# Patient Record
Sex: MALE | Race: WHITE | NOT HISPANIC OR LATINO | Employment: FULL TIME | URBAN - METROPOLITAN AREA
[De-identification: names, ages, dates, MRNs, and addresses within clinical notes are randomized per-mention and may not be internally consistent; named-entity substitution may affect disease eponyms.]

---

## 2022-09-20 ENCOUNTER — OFFICE VISIT (OUTPATIENT)
Dept: ORTHOPEDIC SURGERY | Facility: CLINIC | Age: 42
End: 2022-09-20

## 2022-09-20 VITALS
HEART RATE: 76 BPM | BODY MASS INDEX: 28.28 KG/M2 | HEIGHT: 71 IN | SYSTOLIC BLOOD PRESSURE: 154 MMHG | WEIGHT: 202 LBS | DIASTOLIC BLOOD PRESSURE: 80 MMHG

## 2022-09-20 DIAGNOSIS — M75.82 ROTATOR CUFF TENDINITIS, LEFT: ICD-10-CM

## 2022-09-20 DIAGNOSIS — M25.512 CHRONIC LEFT SHOULDER PAIN: Primary | ICD-10-CM

## 2022-09-20 DIAGNOSIS — M75.52 SUBACROMIAL BURSITIS OF LEFT SHOULDER JOINT: ICD-10-CM

## 2022-09-20 DIAGNOSIS — G89.29 CHRONIC LEFT SHOULDER PAIN: Primary | ICD-10-CM

## 2022-09-20 PROCEDURE — 73030 X-RAY EXAM OF SHOULDER: CPT | Performed by: ORTHOPAEDIC SURGERY

## 2022-09-20 PROCEDURE — 99203 OFFICE O/P NEW LOW 30 MIN: CPT | Performed by: ORTHOPAEDIC SURGERY

## 2022-09-20 PROCEDURE — 20610 DRAIN/INJ JOINT/BURSA W/O US: CPT | Performed by: ORTHOPAEDIC SURGERY

## 2022-09-20 RX ORDER — MELOXICAM 7.5 MG/1
7.5 TABLET ORAL DAILY
Qty: 30 TABLET | Refills: 5 | Status: SHIPPED | OUTPATIENT
Start: 2022-09-20

## 2022-09-20 RX ORDER — LIDOCAINE HYDROCHLORIDE 10 MG/ML
4 INJECTION, SOLUTION EPIDURAL; INFILTRATION; INTRACAUDAL; PERINEURAL
Status: COMPLETED | OUTPATIENT
Start: 2022-09-20 | End: 2022-09-20

## 2022-09-20 RX ORDER — TRIAMCINOLONE ACETONIDE 40 MG/ML
80 INJECTION, SUSPENSION INTRA-ARTICULAR; INTRAMUSCULAR
Status: COMPLETED | OUTPATIENT
Start: 2022-09-20 | End: 2022-09-20

## 2022-09-20 RX ADMIN — TRIAMCINOLONE ACETONIDE 80 MG: 40 INJECTION, SUSPENSION INTRA-ARTICULAR; INTRAMUSCULAR at 14:35

## 2022-09-20 RX ADMIN — LIDOCAINE HYDROCHLORIDE 4 ML: 10 INJECTION, SOLUTION EPIDURAL; INFILTRATION; INTRACAUDAL; PERINEURAL at 14:35

## 2022-09-20 NOTE — PROGRESS NOTES
Subjective: Left shoulder pain     Patient ID: Mp New is a 42 y.o. male.    Chief Complaint:    History of Present Illness 42-year-old male right-hand-dominant seen for his left shoulder which he first injured about.  That time he was on the ground and had to move quickly to avoid being injured by him patient equipment and he injured the left shoulder was initially evaluated by the apparently the company doctor who found no real pathology and nothing else has been done.  He has not seen anybody in the interim but is having persistent and reoccurring pain that he describes as 3 out of 10 a stabbing grinding discomfort.  Daily basis and will have some discomfort with any type of bench presses and overhead type activity.  He does work as some mild discomfort at work but nothing that impedes his ability to work.  His pain primarily with activity but some discomfort at rest.  Has not seen anybody since that injury 15 years ago and does not really take any anti-inflammatory medication.  Gets occasional numbness in his arm after his workouts but that resolved quickly the following day.       Social History     Occupational History   • Not on file   Tobacco Use   • Smoking status: Never Smoker   • Smokeless tobacco: Never Used   Vaping Use   • Vaping Use: Never used   Substance and Sexual Activity   • Alcohol use: Never   • Drug use: Never   • Sexual activity: Yes     Partners: Female     Birth control/protection: None      Review of Systems   Constitutional: Negative for chills, diaphoresis, fever and unexpected weight change.   HENT: Negative for hearing loss, nosebleeds, sore throat and tinnitus.    Eyes: Negative for pain and visual disturbance.   Respiratory: Negative for cough, shortness of breath and wheezing.    Cardiovascular: Negative for chest pain and palpitations.   Gastrointestinal: Negative for abdominal pain, diarrhea, nausea and vomiting.   Endocrine: Negative for cold intolerance, heat intolerance  and polydipsia.   Genitourinary: Negative for difficulty urinating, dysuria and hematuria.   Musculoskeletal: Positive for arthralgias, joint swelling and myalgias.   Skin: Negative for rash and wound.   Allergic/Immunologic: Negative for environmental allergies.   Neurological: Negative for dizziness, syncope and numbness.   Hematological: Does not bruise/bleed easily.   Psychiatric/Behavioral: Negative for dysphoric mood and sleep disturbance. The patient is not nervous/anxious.          History reviewed. No pertinent past medical history.  History reviewed. No pertinent surgical history.  History reviewed. No pertinent family history.      Objective:  Vitals:    09/20/22 1411   BP: 154/80   Pulse: 76         09/20/22  1411   Weight: 91.6 kg (202 lb)     Body mass index is 28.17 kg/m².        Ortho Exam   AP lateral outlet view of the shoulder is completely within normal limits showing no acute or chronic changes.  No prior x-rays available for comparison.  He is alert and oriented x3.  Head is normocephalic and sclerae clear.  Left upper extremity has no motor or sensory deficit as far as radial, ulnar median nerve function.  He has full range of motion of the elbow flexion extension pronation supination.  Minimal pain with flexion.  He can abduct and extend about 170 degrees with minimal pain and abduction.  At 90 degrees against resistance there is just mild pain.  Rotator cuff function is 4-1/2 to 5 out of 5.  Mildly positive Biggs sign.  Negative Speed test.  Negative [place]'s test.  External rotation is 45 degrees internal rotation is to T12 although it does cause some mild discomfort.  Is good capillary refill.  He tolerates Advil Aleve when he takes it but is not taking anything on a regular basis.    Assessment:        1. Chronic left shoulder pain    2. Subacromial bursitis of left shoulder joint    3. Rotator cuff tendinitis, left           Plan: Reviewed at length with the patient his history x-ray  and physical exam.  I believe we are dealing with more of a chronic subacromial bursitis and tendinitis than any surgical pathology.  I do not believe he has a rotator cuff or labral tear.  He is essentially had no treatment for this recurrent inflammation of the shoulder in the past 15 years.  After reviewing treatment options we will begin meloxicam 7.5 daily but also going to proceed with a subacromial injection of 4 cc lidocaine and 2 cc of Kenalog which was given to the posterior portal after sterile prepping.  Postinjection instructions given to the patient.  He is to return in 4 weeks if still symptomatic we will proceed with an MRI but he is asymptomatic he will return as needed and take the medication and then on an as-needed basis.  Patient was in agreement            Work Status:    BRENDON query complete.    Orders:  Orders Placed This Encounter   Procedures   • Large Joint Arthrocentesis: L subacromial bursa   • XR Shoulder 2+ View Left       Medications:  New Medications Ordered This Visit   Medications   • meloxicam (MOBIC) 7.5 MG tablet     Sig: Take 1 tablet by mouth Daily.     Dispense:  30 tablet     Refill:  5       Followup:  Return in about 4 weeks (around 10/18/2022).  Large Joint Arthrocentesis: L subacromial bursa  Date/Time: 9/20/2022 2:35 PM  Consent given by: patient  Site marked: site marked  Timeout: Immediately prior to procedure a time out was called to verify the correct patient, procedure, equipment, support staff and site/side marked as required   Supporting Documentation  Indications: pain   Procedure Details  Location: shoulder - L subacromial bursa  Preparation: Patient was prepped and draped in the usual sterile fashion  Needle size: 22 G  Approach: posterior  Medications administered: 80 mg triamcinolone acetonide 40 MG/ML; 4 mL lidocaine PF 1% 1 %  Patient tolerance: patient tolerated the procedure well with no immediate complications      BMI is >= 25 and <30. (Overweight)  The following options were offered after discussion;: weight loss educational material (shared in after visit summary)          Dictated utilizing Dragon dictation

## 2022-10-25 ENCOUNTER — OFFICE VISIT (OUTPATIENT)
Dept: ORTHOPEDIC SURGERY | Facility: CLINIC | Age: 42
End: 2022-10-25

## 2022-10-25 VITALS — HEIGHT: 71 IN | WEIGHT: 202 LBS | BODY MASS INDEX: 28.28 KG/M2

## 2022-10-25 DIAGNOSIS — M75.52 SUBACROMIAL BURSITIS OF LEFT SHOULDER JOINT: Primary | ICD-10-CM

## 2022-10-25 PROCEDURE — 99213 OFFICE O/P EST LOW 20 MIN: CPT | Performed by: ORTHOPAEDIC SURGERY

## 2022-10-25 NOTE — PROGRESS NOTES
Subjective: Left shoulder pain     Patient ID: Mp New is a 42 y.o. male.    Chief Complaint:    History of Present Illness 42-year male returns with complete resolution of his pain the cortisone injection and the meloxicam started on his last visit.  States he is able to complete his workout and do more as far as completing his workout than he has in a long time.  He has no pain but still experiences occasional popping in the shoulder.  Tolerating the meloxicam.       Social History     Occupational History   • Not on file   Tobacco Use   • Smoking status: Never   • Smokeless tobacco: Never   Vaping Use   • Vaping Use: Never used   Substance and Sexual Activity   • Alcohol use: Never   • Drug use: Never   • Sexual activity: Yes     Partners: Female     Birth control/protection: None      Review of Systems      No past medical history on file.  No past surgical history on file.  No family history on file.      Objective:  There were no vitals filed for this visit.      10/25/22  1352   Weight: 91.6 kg (202 lb)     Body mass index is 28.17 kg/m².        Ortho Exam   He is alert and oriented x3.  He has no motor or sensory deficit as far as radial, ulnar median nerve function.  He has full range of motion of the elbow.  He can abduct and extend about 170 degrees and at 90 degrees of resistance there is no pain with rotator cuff function 5/5.  Negative Speed test.  Negative Biggs sign.  External rotation is 40 degrees and internal rotation is to T12.  Skin is cool to touch.  Tolerating meloxicam.    Assessment:        1. Subacromial bursitis of left shoulder joint           Plan: I recommend that he take the meloxicam for another month and then on an as-needed basis.  Return to see me only if he is having pain once again despite the meloxicam.  Patient was in agreement.  Answered all questions          Work Status:    BRENDON query complete.    Orders:  No orders of the defined types were placed in this  encounter.      Medications:  No orders of the defined types were placed in this encounter.      Followup:  Return if symptoms worsen or fail to improve.          Dictated utilizing Dragon dictation